# Patient Record
Sex: MALE | Race: ASIAN | NOT HISPANIC OR LATINO | URBAN - METROPOLITAN AREA
[De-identification: names, ages, dates, MRNs, and addresses within clinical notes are randomized per-mention and may not be internally consistent; named-entity substitution may affect disease eponyms.]

---

## 2021-04-25 ENCOUNTER — IMMUNIZATIONS (OUTPATIENT)
Dept: FAMILY MEDICINE CLINIC | Facility: HOSPITAL | Age: 21
End: 2021-04-25

## 2021-04-25 DIAGNOSIS — Z23 ENCOUNTER FOR IMMUNIZATION: Primary | ICD-10-CM

## 2021-04-25 PROCEDURE — 0001A SARS-COV-2 / COVID-19 MRNA VACCINE (PFIZER-BIONTECH) 30 MCG: CPT

## 2021-04-25 PROCEDURE — 91300 SARS-COV-2 / COVID-19 MRNA VACCINE (PFIZER-BIONTECH) 30 MCG: CPT

## 2021-05-19 ENCOUNTER — IMMUNIZATIONS (OUTPATIENT)
Dept: FAMILY MEDICINE CLINIC | Facility: HOSPITAL | Age: 21
End: 2021-05-19

## 2021-05-19 DIAGNOSIS — Z23 ENCOUNTER FOR IMMUNIZATION: Primary | ICD-10-CM

## 2021-05-19 PROCEDURE — 91300 SARS-COV-2 / COVID-19 MRNA VACCINE (PFIZER-BIONTECH) 30 MCG: CPT

## 2021-05-19 PROCEDURE — 0002A SARS-COV-2 / COVID-19 MRNA VACCINE (PFIZER-BIONTECH) 30 MCG: CPT

## 2022-09-09 ENCOUNTER — OFFICE VISIT (OUTPATIENT)
Dept: URGENT CARE | Age: 22
End: 2022-09-09
Payer: COMMERCIAL

## 2022-09-09 VITALS
SYSTOLIC BLOOD PRESSURE: 112 MMHG | WEIGHT: 183 LBS | RESPIRATION RATE: 16 BRPM | HEIGHT: 70 IN | HEART RATE: 59 BPM | DIASTOLIC BLOOD PRESSURE: 70 MMHG | BODY MASS INDEX: 26.2 KG/M2

## 2022-09-09 DIAGNOSIS — T14.8XXA BRUISING: Primary | ICD-10-CM

## 2022-09-09 PROCEDURE — 99213 OFFICE O/P EST LOW 20 MIN: CPT

## 2022-09-09 NOTE — PROGRESS NOTES
[facility]        NAME: Bari Plunkett is a 25 y o  male  : 2000    MRN: [id number]  DATE: 2022  TIME: 7:35 PM    Assessment and Plan   Bruising [T14  8XXA]  1  Bruising     27-year-old male presents for evaluation of bruising of right medial calf  Ace wrap applied in office, patient advised to rest, ice, compress and elevate for localized swelling and bruising  May take over-the-counter Tylenol/NSAIDs for tenderness  Patient advised to follow-up with primary care provider soon as possible  Report to emergency department if you notice calf pain while walking, generalized swelling of the entire leg or worsening bruising  Patient Instructions   Contusion in Adults   WHAT YOU NEED TO KNOW:   A contusion is a bruise that appears on your skin after an injury  A bruise happens when small blood vessels tear but skin does not  Blood leaks into nearby tissue, such as soft tissue or muscle  DISCHARGE INSTRUCTIONS:   Return to the emergency department if:   · You have new trouble moving the injured area      · You have tingling or numbness in or near the injured area      · Your hand or foot below the bruise gets cold or turns pale      Call your doctor if:   · You find a new lump in the injured area      · Your symptoms do not improve with treatment after 4 to 5 days      · You have questions or concerns about your condition or care      Medicines: You may need any of the following:  · NSAIDs  help decrease swelling and pain or fever  This medicine is available with or without a doctor's order  NSAIDs can cause stomach bleeding or kidney problems in certain people  If you take blood thinner medicine, always ask your healthcare provider if NSAIDs are safe for you  Always read the medicine label and follow directions      · Prescription pain medicine  may be given  Ask your healthcare provider how to take this medicine safely  Some prescription pain medicines contain acetaminophen   Do not take other medicines that contain acetaminophen without talking to your healthcare provider  Too much acetaminophen may cause liver damage  Prescription pain medicine may cause constipation  Ask your healthcare provider how to prevent or treat constipation       · Take your medicine as directed  Contact your healthcare provider if you think your medicine is not helping or if you have side effects  Tell him of her if you are allergic to any medicine  Keep a list of the medicines, vitamins, and herbs you take  Include the amounts, and when and why you take them  Bring the list or the pill bottles to follow-up visits  Carry your medicine list with you in case of an emergency      Help a contusion heal:   · Rest the injured area  or use it less than usual  If you bruised your leg or foot, you may need crutches or a cane to help you walk  This will help you keep weight off your injured body part       · Apply ice  to decrease swelling and pain  Ice may also help prevent tissue damage  Use an ice pack, or put crushed ice in a plastic bag  Cover it with a towel and place it on your bruise for 15 to 20 minutes every hour or as directed      · Use compression  to support the area and decrease swelling  Wrap an elastic bandage around the area over the bruised muscle  Make sure the bandage is not too tight  You should be able to fit 1 finger between the bandage and your skin      · Elevate (raise) your injured body part  above the level of your heart to help decrease pain and swelling  Use pillows, blankets, or rolled towels to elevate the area as often as you can      · Do not drink alcohol  as directed  Alcohol may slow healing      · Do not stretch injured muscles  right after your injury  Ask your healthcare provider when and how you may safely stretch after your injury  Gentle stretches can help increase your flexibility      · Do not massage the area or put heating pads  on the bruise right after your injury   Heat and massage may slow healing  Your healthcare provider may tell you to apply heat after several days  At that time, heat will start to help the injury heal      Prevent another contusion:   · Stretch and warm up before you play sports or exercise      · Wear protective gear when you play sports  Examples are shin guards and padding       · If you begin a new physical activity, start slowly to give your body a chance to adjust      Follow up with your doctor as directed:  Write down your questions so you remember to ask them during your visits  © Copyright [employer] 2022 Information is for End User's use only and may not be sold, redistributed or otherwise used for commercial purposes  All illustrations and images included in CareNotes® are the copyrighted property of A D A M , Inc  or [facility] Mallika Pink   The above information is an  only  It is not intended as medical advice for individual conditions or treatments  Talk to your doctor, nurse or pharmacist before following any medical regimen to see if it is safe and effective for you        Follow up with PCP in 3-5 days  Proceed to  ER if symptoms worsen  Chief Complaint     Chief Complaint   Patient presents with    Bleeding/Bruising     Patient a few days ago noticed bruising in his RT Calf  States he does not recall any injury to the area  History of Present Illness       Patient is a 51-year-old male with no significant past medical history who presents for evaluation of bruising of his right medial calf since Monday  He reports that he did perform some vigorous exercises of his legs in the gym on Friday, but denies any known trauma to his right leg  He is able to bear weight and endorses only some mild tenderness at site of bruising  He denies numbness, tingling, joint swelling, fever, chest pain or shortness of breath  Review of Systems   Review of Systems   Constitutional: Negative for chills, fatigue and fever  HENT: Negative for congestion, ear pain, rhinorrhea, sinus pressure, sinus pain, sneezing and sore throat  Eyes: Negative for pain and visual disturbance  Respiratory: Negative  Negative for apnea, cough, choking, chest tightness, shortness of breath, wheezing and stridor  Cardiovascular: Negative for chest pain and palpitations  Gastrointestinal: Negative for abdominal pain, diarrhea, nausea and vomiting  Endocrine: Negative  Genitourinary: Negative for dysuria and hematuria  Musculoskeletal: Negative for arthralgias, back pain, myalgias, neck pain and neck stiffness  Skin: Positive for color change  Negative for rash  Allergic/Immunologic: Negative  Negative for environmental allergies  Neurological: Negative  Negative for dizziness, seizures, syncope, facial asymmetry, light-headedness, numbness and headaches  Hematological: Negative  Negative for adenopathy  Psychiatric/Behavioral: Negative  All other systems reviewed and are negative  Current Medications     No current outpatient medications on file  Current Allergies     Allergies as of 09/09/2022    (No Known Allergies)            The following portions of the patient's history were reviewed and updated as appropriate: allergies, current medications, past family history, past medical history, past social history, past surgical history and problem list      History reviewed  No pertinent past medical history  No past surgical history on file  No family history on file  Medications have been verified  Objective   /70   Pulse 59   Resp 16   Ht 5' 10" (1 778 m)   Wt 83 kg (183 lb)   BMI 26 26 kg/m²        Physical Exam     Physical Exam  Vitals reviewed  Constitutional:       General: He is not in acute distress  Appearance: Normal appearance  He is not ill-appearing, toxic-appearing or diaphoretic  HENT:      Head: Normocephalic and atraumatic        Right Ear: External ear normal       Left Ear: External ear normal       Nose: Nose normal  No congestion or rhinorrhea  Mouth/Throat:      Mouth: Mucous membranes are moist    Eyes:      Extraocular Movements: Extraocular movements intact  Pupils: Pupils are equal, round, and reactive to light  Cardiovascular:      Rate and Rhythm: Normal rate and regular rhythm  Pulses: Normal pulses  Heart sounds: Normal heart sounds  No murmur heard  No friction rub  No gallop  Pulmonary:      Effort: Pulmonary effort is normal  No respiratory distress  Breath sounds: Normal breath sounds  No stridor  No wheezing, rhonchi or rales  Chest:      Chest wall: No tenderness  Abdominal:      General: Abdomen is flat  Palpations: Abdomen is soft  Musculoskeletal:         General: Normal range of motion  Cervical back: Normal range of motion and neck supple  No rigidity or tenderness  Lymphadenopathy:      Cervical: No cervical adenopathy  Skin:     General: Skin is warm and dry  Capillary Refill: Capillary refill takes less than 2 seconds  Findings: Bruising present  No erythema  Comments: Significant bruising of right medial calf some trace bruising of medial aspect of right calcaneus  Homans sign negative  Only localized swelling at area of bruising  Neurological:      General: No focal deficit present  Mental Status: He is alert     Psychiatric:         Mood and Affect: Mood normal

## 2022-09-09 NOTE — PATIENT INSTRUCTIONS
Follow-up with primary care provider soon as possible  Present to emergency department if symptoms worsen

## 2024-02-16 ENCOUNTER — OFFICE VISIT (OUTPATIENT)
Dept: URGENT CARE | Facility: CLINIC | Age: 24
End: 2024-02-16
Payer: COMMERCIAL

## 2024-02-16 DIAGNOSIS — J02.9 SORE THROAT: Primary | ICD-10-CM

## 2024-02-16 LAB — S PYO AG THROAT QL: NEGATIVE

## 2024-02-16 PROCEDURE — 87070 CULTURE OTHR SPECIMN AEROBIC: CPT | Performed by: STUDENT IN AN ORGANIZED HEALTH CARE EDUCATION/TRAINING PROGRAM

## 2024-02-16 PROCEDURE — 99213 OFFICE O/P EST LOW 20 MIN: CPT | Performed by: STUDENT IN AN ORGANIZED HEALTH CARE EDUCATION/TRAINING PROGRAM

## 2024-02-16 PROCEDURE — 87880 STREP A ASSAY W/OPTIC: CPT | Performed by: STUDENT IN AN ORGANIZED HEALTH CARE EDUCATION/TRAINING PROGRAM

## 2024-02-17 VITALS
RESPIRATION RATE: 18 BRPM | HEART RATE: 105 BPM | TEMPERATURE: 99.2 F | OXYGEN SATURATION: 99 % | DIASTOLIC BLOOD PRESSURE: 76 MMHG | SYSTOLIC BLOOD PRESSURE: 120 MMHG

## 2024-02-17 NOTE — PROGRESS NOTES
[facility]        NAME: Edil Ko is a 24 y.o. male  : 2000    MRN: [id number]    Assessment and Plan   Sore throat [J02.9]  1. Sore throat  Throat culture    POCT rapid strepA        High suspicion for strep as there is concern exudates, cervical adenopathy along with absence of cough.  Rapid strep is negative.  Will send for throat culture.  Patient agreeable to wait for throat culture results prior to starting antibiotics.  Discussed symptomatic and supportive measures for management.      Patient Instructions     See wrap up for details  Follow up with PCP in 3-5 days.  Proceed to  ER if symptoms worsen.    Chief Complaint     Chief Complaint   Patient presents with    Sore Throat         History of Present Illness     HPI    Patient presents to office today for symptoms that started 3 days ago with sore throat, fever, fatigue.  Reports Tmax 101.9 Fahrenheit.  Denies any cough.  Does report pain with swallowing.  Has been taking ibuprofen with some relief.  Denies any history of recurrent throat infections.  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    Review of Systems   Review of Systems   Constitutional:  Positive for fatigue and fever. Negative for chills.   HENT:  Positive for trouble swallowing. Negative for ear pain and sore throat.    Eyes:  Negative for pain and visual disturbance.   Respiratory:  Positive for cough. Negative for shortness of breath.    Cardiovascular:  Negative for chest pain and palpitations.   Gastrointestinal:  Negative for abdominal pain and vomiting.   Genitourinary:  Negative for dysuria and hematuria.   Musculoskeletal:  Negative for arthralgias and back pain.   Skin:  Negative for color change and rash.   Neurological:  Negative for seizures and syncope.   All other systems reviewed and are negative.    Current Medications     No current outpatient medications on file.    Current Allergies     Allergies as of 2024    (No Known  Allergies)            The following portions of the patient's history were reviewed and updated as appropriate: allergies, current medications, past family history, past medical history, past social history, past surgical history and problem list.     No past medical history on file.    No past surgical history on file.    No family history on file.      Medications have been verified.        Objective   /76   Pulse 105   Temp 99.2 °F (37.3 °C)   Resp 18   SpO2 99%        Physical Exam     Physical Exam  Constitutional:       Appearance: Normal appearance.   HENT:      Head: Normocephalic and atraumatic.      Right Ear: Tympanic membrane and ear canal normal.      Left Ear: Tympanic membrane and ear canal normal.      Mouth/Throat:      Mouth: Mucous membranes are moist.      Pharynx: Oropharynx is clear. Posterior oropharyngeal erythema present. No oropharyngeal exudate.      Tonsils: Tonsillar exudate present. No tonsillar abscesses. 2+ on the right. 2+ on the left.   Eyes:      General: No scleral icterus.     Conjunctiva/sclera: Conjunctivae normal.   Cardiovascular:      Rate and Rhythm: Normal rate.   Pulmonary:      Effort: Pulmonary effort is normal. No respiratory distress.   Musculoskeletal:      Cervical back: Normal range of motion.   Lymphadenopathy:      Cervical: Cervical adenopathy present.   Neurological:      General: No focal deficit present.      Mental Status: He is alert and oriented to person, place, and time.   Psychiatric:         Mood and Affect: Mood normal.         Behavior: Behavior normal.

## 2024-02-18 LAB — BACTERIA THROAT CULT: NORMAL

## 2025-01-13 ENCOUNTER — HOSPITAL ENCOUNTER (EMERGENCY)
Facility: HOSPITAL | Age: 25
Discharge: HOME | End: 2025-01-13
Attending: EMERGENCY MEDICINE
Payer: OTHER MISCELLANEOUS

## 2025-01-13 VITALS
HEART RATE: 52 BPM | OXYGEN SATURATION: 100 % | RESPIRATION RATE: 18 BRPM | SYSTOLIC BLOOD PRESSURE: 134 MMHG | WEIGHT: 189 LBS | BODY MASS INDEX: 27.12 KG/M2 | TEMPERATURE: 97.5 F | DIASTOLIC BLOOD PRESSURE: 80 MMHG

## 2025-01-13 DIAGNOSIS — S05.01XA CORNEAL ABRASION, RIGHT, INITIAL ENCOUNTER: Primary | ICD-10-CM

## 2025-01-13 PROCEDURE — 99283 EMERGENCY DEPT VISIT LOW MDM: CPT

## 2025-01-13 PROCEDURE — 99284 EMERGENCY DEPT VISIT MOD MDM: CPT | Performed by: EMERGENCY MEDICINE

## 2025-01-13 RX ORDER — OFLOXACIN 3 MG/ML
2 SOLUTION/ DROPS OPHTHALMIC ONCE
Status: COMPLETED | OUTPATIENT
Start: 2025-01-13 | End: 2025-01-13

## 2025-01-13 RX ORDER — TETRACAINE HYDROCHLORIDE 5 MG/ML
2 SOLUTION OPHTHALMIC ONCE
Status: COMPLETED | OUTPATIENT
Start: 2025-01-13 | End: 2025-01-13

## 2025-01-13 RX ORDER — OFLOXACIN 3 MG/ML
2 SOLUTION/ DROPS OPHTHALMIC 4 TIMES DAILY
Qty: 5 ML | Refills: 0 | Status: SHIPPED | OUTPATIENT
Start: 2025-01-13 | End: 2025-01-18

## 2025-01-13 RX ADMIN — TETRACAINE HYDROCHLORIDE 2 DROP: 5 SOLUTION OPHTHALMIC at 17:27

## 2025-01-13 RX ADMIN — OFLOXACIN 2 DROP: 3 SOLUTION OPHTHALMIC at 18:30

## 2025-01-13 RX ADMIN — FLUORESCEIN SODIUM 2 STRIP: 1 STRIP OPHTHALMIC at 17:28

## 2025-01-13 NOTE — Clinical Note
Edil Ko was seen and treated in our emergency department on 1/13/2025.                Diagnosis:     Edil  may return to work on return date.    He may return on this date: 01/15/2025         If you have any questions or concerns, please don't hesitate to call.      Rogelio Perez, DO    ______________________________           _______________          _______________  Hospital Representative                              Date                                Time

## 2025-01-13 NOTE — ED ATTENDING ATTESTATION
1/13/2025  ILeydi DO, saw and evaluated the patient. I have discussed the patient with the resident/non-physician practitioner and agree with the resident's/non-physician practitioner's findings, Plan of Care, and MDM as documented in the resident's/non-physician practitioner's note, except where noted. All available labs and Radiology studies were reviewed.  I was present for key portions of any procedure(s) performed by the resident/non-physician practitioner and I was immediately available to provide assistance.       At this point I agree with the current assessment done in the Emergency Department.  I have conducted an independent evaluation of this patient a history and physical is as follows:    24-year-old male presents emerged department with right eye pain.  States he was hit by a tree branch in the right eye while walking, this was around 10:30 AM.  Had some blurry vision, however states is due to tearing.  No pain in the other eye.  He did not fall, no headache, no loss of consciousness.  Does not wear contacts or corrective lenses.  No history of eye surgery.  States his tetanus was last updated in 2018.    Upon physical examination, patient overall appears well, not in acute distress, has mild right eye conjunctival injection, extraocular muscles are intact, pupils equal reactive to light.      Patient's pain resolved with tetracaine.  Upon fluorescein stain and Woods lamp examination, patient noted to have corneal abrasion to the right eye cornea 2 o'clock position and 7 o'clock position relative to the pupil.  No Galina sign. No hyphema.  Exam consistent with corneal abrasion.  Started on antibiotic eyedrops.  Advised ophthalmology follow-up, return precautions were discussed.    ED Course         Critical Care Time  Procedures

## 2025-01-13 NOTE — ED PROVIDER NOTES
Time reflects when diagnosis was documented in both MDM as applicable and the Disposition within this note       Time User Action Codes Description Comment    1/13/2025  6:16 PM Leydi Moon Add [S05.01XA] Corneal abrasion, right, initial encounter           ED Disposition       ED Disposition   Discharge    Condition   Stable    Date/Time   Mon Jan 13, 2025  6:16 PM    Comment   Edil Ko discharge to home/self care.                   Assessment & Plan       Medical Decision Making  24-year-old male with noncontributory past medical history presenting for evaluation of right-sided eye injury    Differential diagnoses include but not limited to: Corneal abrasion, fracture, globe rupture, subconjunctival hematoma    Physical exam findings significant for decreased visual acuity in right eye.  20/50 OD 20/20 OS.  Patient given tetracaine drops and fluorescein dye and right eye and found to have corneal abrasion over central eye.  Prescribed ofloxacin ophthalmic solution which patient was instructed to take 2 drops 4 times daily for 5 days.  Given information for ophthalmology follow-up. All questions answered and patient discharged home in stable condition.        Risk  Prescription drug management.             Medications   tetracaine 0.5 % ophthalmic solution 2 drop (2 drops Right Eye Given by Other 1/13/25 1727)   fluorescein sodium sterile ophthalmic strip 2 strip (2 strips Both Eyes Given by Other 1/13/25 1728)   ofloxacin (OCUFLOX) 0.3 % ophthalmic solution 2 drop (2 drops Right Eye Given 1/13/25 1830)       ED Risk Strat Scores                            History of Present Illness       Chief Complaint   Patient presents with    Eye Injury     Hit with tree branch in right eye while at work. Blurry vision and pain.       History reviewed. No pertinent past medical history.   History reviewed. No pertinent surgical history.   History reviewed. No pertinent family history.   Social History     Tobacco  Use    Smoking status: Never    Smokeless tobacco: Never   Vaping Use    Vaping status: Never Used   Substance Use Topics    Alcohol use: Yes     Comment: socially    Drug use: Never      E-Cigarette/Vaping    E-Cigarette Use Never User       E-Cigarette/Vaping Substances      I have reviewed and agree with the history as documented.     24-year-old male with noncontributory past medical history presenting for evaluation of right-sided eye injury.  Patient was struck with a tree branch in right eye while at work, currently complaining of blurry vision and pain.  States that he has 20/20 vision in both eyes and does not require any contacts or glasses.  Denies any previous injury to the eye.  Denies any additional headaches, vision changes in left eye, additional traumatic injuries. At present denies any headaches, dizziness, fever, chills, sore throat, cough, chest pain, shortness of breath, abdominal pain, nausea, vomiting, diarrhea, dysuria, hematuria.           Review of Systems   Eyes:  Positive for photophobia, pain and visual disturbance.   All other systems reviewed and are negative.          Objective       ED Triage Vitals [01/13/25 1605]   Temperature Pulse Blood Pressure Respirations SpO2 Patient Position - Orthostatic VS   97.5 °F (36.4 °C) (!) 52 134/80 18 100 % --      Temp src Heart Rate Source BP Location FiO2 (%) Pain Score    -- -- -- -- --      Vitals      Date and Time Temp Pulse SpO2 Resp BP Pain Score FACES Pain Rating User   01/13/25 1605 97.5 °F (36.4 °C) 52 100 % 18 134/80 -- -- MD            Physical Exam  Vitals and nursing note reviewed.   Constitutional:       General: He is not in acute distress.     Appearance: He is well-developed.   HENT:      Head: Normocephalic and atraumatic.   Eyes:      General: No visual field deficit.        Right eye: No foreign body or discharge.         Left eye: No foreign body or discharge.      Extraocular Movements: Extraocular movements intact.       Right eye: Normal extraocular motion.      Left eye: Normal extraocular motion.      Conjunctiva/sclera:      Right eye: Right conjunctiva is injected.      Comments: 20/50 OD    20/20 OS    Fluorescin uptake noted centrally in right eye   Cardiovascular:      Rate and Rhythm: Normal rate and regular rhythm.      Heart sounds: No murmur heard.  Pulmonary:      Effort: Pulmonary effort is normal. No respiratory distress.      Breath sounds: Normal breath sounds.   Abdominal:      Palpations: Abdomen is soft.      Tenderness: There is no abdominal tenderness.   Musculoskeletal:         General: No swelling.      Cervical back: Neck supple.   Skin:     General: Skin is warm and dry.      Capillary Refill: Capillary refill takes less than 2 seconds.   Neurological:      Mental Status: He is alert.   Psychiatric:         Mood and Affect: Mood normal.         Results Reviewed       None            No orders to display       Procedures    ED Medication and Procedure Management   None     Discharge Medication List as of 1/13/2025  6:30 PM        START taking these medications    Details   ofloxacin (OCUFLOX) 0.3 % ophthalmic solution Administer 2 drops to the right eye 4 (four) times a day for 5 days, Starting Mon 1/13/2025, Until Sat 1/18/2025, Normal           No discharge procedures on file.  ED SEPSIS DOCUMENTATION   Time reflects when diagnosis was documented in both MDM as applicable and the Disposition within this note       Time User Action Codes Description Comment    1/13/2025  6:16 PM Leydi Moon Add [S05.01XA] Corneal abrasion, right, initial encounter                  Rogelio Perez DO  01/13/25 2055